# Patient Record
(demographics unavailable — no encounter records)

---

## 2024-11-05 NOTE — HISTORY OF PRESENT ILLNESS
[FreeTextEntry1] : Please refer to NP note below.   56 year-old female with HTN, presents for evaluation of CP.  Pt reports frequent lower CP for 1 week, described as tightness, not related to activities, lasting for a few hours.  Pt reports palpitations is associated with CP, described as fast beat, lasting for a few minutes.  Pt also noted SOB is associated with CP. Pt denies dizziness.  Pt denies h/o syncope.  Pt is on Losartan 50 mg. She has order of Amlodipine 10 mg but did not take.  Today's /92 P 76, repeated /96 P 76.  Exam unremarkable.  ECG showed no ischemic changes.  I advised patient to undergo an echocardiogram and a treadmill stress test.  I advised patient to wear a 7-day event monitor.